# Patient Record
Sex: MALE | URBAN - METROPOLITAN AREA
[De-identification: names, ages, dates, MRNs, and addresses within clinical notes are randomized per-mention and may not be internally consistent; named-entity substitution may affect disease eponyms.]

---

## 2019-09-18 ENCOUNTER — DOCUMENTATION ONLY (OUTPATIENT)
Dept: REHABILITATION | Facility: HOSPITAL | Age: 69
End: 2019-09-18

## 2019-09-18 NOTE — PROGRESS NOTES
REHAB SERVICES OUTPATIENT DISCHARGE SUMMARY  Speech Therapy      Name:  Shawn Orozco  Date:  09/18/2019    Date of Evaluation:  05/13/2019  Physician:  Frankie Pringle MD  Total # Of Visits:  11  Cancelled:  1  No Shows:  0  Diagnosis:  No diagnosis found.    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  Patient tolerated NMES to L facial musculature with no complaints.  Last measurement of degree of facial flaccidity was 6 degrees with a goal of 7degrees.    The patient is to be discharged from our Therapy service for the following reason(s):   Discharge planning was to be initiated with Patient and he was transitioned into [employer] for continuance of schedule.  Patient canceled his last session and was to be scheduled after the facility's crossover to Epic EMR system however he did not attend.    Degree of Goal Achievement:  Patient has met all goals for performing oral/facial exercise with MIN assist and to decreased degree of facial flaccidity to 7 degrees.    Patient Education:  Ongoing during tx re HEP    Discharge Plan:  Home Program:  Continue as rx.

## 2025-02-26 ENCOUNTER — OFFICE VISIT (OUTPATIENT)
Dept: PRIMARY CARE CLINIC | Facility: CLINIC | Age: 75
End: 2025-02-26
Payer: MEDICARE

## 2025-02-26 VITALS
HEIGHT: 69 IN | SYSTOLIC BLOOD PRESSURE: 120 MMHG | DIASTOLIC BLOOD PRESSURE: 70 MMHG | HEART RATE: 81 BPM | WEIGHT: 245.69 LBS | RESPIRATION RATE: 16 BRPM | BODY MASS INDEX: 36.39 KG/M2 | OXYGEN SATURATION: 95 % | TEMPERATURE: 98 F

## 2025-02-26 DIAGNOSIS — R73.03 PREDIABETES: ICD-10-CM

## 2025-02-26 DIAGNOSIS — E78.5 HYPERLIPIDEMIA, UNSPECIFIED HYPERLIPIDEMIA TYPE: ICD-10-CM

## 2025-02-26 DIAGNOSIS — J32.0 CHRONIC MAXILLARY SINUSITIS: ICD-10-CM

## 2025-02-26 DIAGNOSIS — Z00.00 ANNUAL PHYSICAL EXAM: Primary | ICD-10-CM

## 2025-02-26 DIAGNOSIS — J44.9 CHRONIC OBSTRUCTIVE PULMONARY DISEASE, UNSPECIFIED COPD TYPE: ICD-10-CM

## 2025-02-26 DIAGNOSIS — E66.01 SEVERE OBESITY (BMI 35.0-39.9) WITH COMORBIDITY: ICD-10-CM

## 2025-02-26 DIAGNOSIS — M1A.0720 CHRONIC GOUT OF LEFT FOOT, UNSPECIFIED CAUSE: ICD-10-CM

## 2025-02-26 DIAGNOSIS — Z76.89 ENCOUNTER TO ESTABLISH CARE: ICD-10-CM

## 2025-02-26 DIAGNOSIS — I10 PRIMARY HYPERTENSION: ICD-10-CM

## 2025-02-26 PROBLEM — M54.12 CERVICAL RADICULOPATHY: Status: ACTIVE | Noted: 2025-02-26

## 2025-02-26 PROBLEM — M16.9 OSTEOARTHRITIS OF HIP: Status: ACTIVE | Noted: 2025-02-26

## 2025-02-26 PROBLEM — E55.9 VITAMIN D DEFICIENCY: Status: ACTIVE | Noted: 2025-02-26

## 2025-02-26 PROBLEM — D48.5 NEOPLASM OF UNCERTAIN BEHAVIOR OF SKIN: Status: ACTIVE | Noted: 2025-02-26

## 2025-02-26 PROBLEM — E66.812 OBESITY, CLASS II, BMI 35-39.9: Status: ACTIVE | Noted: 2025-02-26

## 2025-02-26 PROBLEM — M17.32 UNILATERAL POST-TRAUMATIC OSTEOARTHRITIS, LEFT KNEE: Status: ACTIVE | Noted: 2025-02-26

## 2025-02-26 PROBLEM — E66.9 OBESITY: Status: ACTIVE | Noted: 2025-02-26

## 2025-02-26 PROCEDURE — 3078F DIAST BP <80 MM HG: CPT | Mod: CPTII,S$GLB,, | Performed by: STUDENT IN AN ORGANIZED HEALTH CARE EDUCATION/TRAINING PROGRAM

## 2025-02-26 PROCEDURE — 1160F RVW MEDS BY RX/DR IN RCRD: CPT | Mod: CPTII,S$GLB,, | Performed by: STUDENT IN AN ORGANIZED HEALTH CARE EDUCATION/TRAINING PROGRAM

## 2025-02-26 PROCEDURE — 99204 OFFICE O/P NEW MOD 45 MIN: CPT | Mod: S$GLB,,, | Performed by: STUDENT IN AN ORGANIZED HEALTH CARE EDUCATION/TRAINING PROGRAM

## 2025-02-26 PROCEDURE — 1126F AMNT PAIN NOTED NONE PRSNT: CPT | Mod: CPTII,S$GLB,, | Performed by: STUDENT IN AN ORGANIZED HEALTH CARE EDUCATION/TRAINING PROGRAM

## 2025-02-26 PROCEDURE — 3074F SYST BP LT 130 MM HG: CPT | Mod: CPTII,S$GLB,, | Performed by: STUDENT IN AN ORGANIZED HEALTH CARE EDUCATION/TRAINING PROGRAM

## 2025-02-26 PROCEDURE — 1159F MED LIST DOCD IN RCRD: CPT | Mod: CPTII,S$GLB,, | Performed by: STUDENT IN AN ORGANIZED HEALTH CARE EDUCATION/TRAINING PROGRAM

## 2025-02-26 PROCEDURE — 99999 PR PBB SHADOW E&M-NEW PATIENT-LVL V: CPT | Mod: PBBFAC,,, | Performed by: STUDENT IN AN ORGANIZED HEALTH CARE EDUCATION/TRAINING PROGRAM

## 2025-02-26 PROCEDURE — 1101F PT FALLS ASSESS-DOCD LE1/YR: CPT | Mod: CPTII,S$GLB,, | Performed by: STUDENT IN AN ORGANIZED HEALTH CARE EDUCATION/TRAINING PROGRAM

## 2025-02-26 PROCEDURE — 3288F FALL RISK ASSESSMENT DOCD: CPT | Mod: CPTII,S$GLB,, | Performed by: STUDENT IN AN ORGANIZED HEALTH CARE EDUCATION/TRAINING PROGRAM

## 2025-02-26 RX ORDER — MONTELUKAST SODIUM 10 MG/1
10 TABLET ORAL DAILY
COMMUNITY
Start: 2020-01-08

## 2025-02-26 RX ORDER — CYANOCOBALAMIN (VITAMIN B-12) 500 MCG
10 TABLET ORAL DAILY
COMMUNITY
Start: 2024-04-08

## 2025-02-26 RX ORDER — IBUPROFEN 800 MG/1
800 TABLET ORAL 3 TIMES DAILY
COMMUNITY
Start: 1979-08-31

## 2025-02-26 RX ORDER — CETIRIZINE HYDROCHLORIDE 10 MG/1
10 TABLET ORAL NIGHTLY
COMMUNITY
Start: 2024-07-15

## 2025-02-26 RX ORDER — AMLODIPINE BESYLATE 10 MG/1
0.5 TABLET ORAL DAILY
COMMUNITY
Start: 2015-01-08

## 2025-02-26 RX ORDER — FLUTICASONE PROPIONATE AND SALMETEROL 250; 50 UG/1; UG/1
1 POWDER RESPIRATORY (INHALATION) DAILY PRN
COMMUNITY
Start: 2024-07-31

## 2025-02-26 RX ORDER — ATORVASTATIN CALCIUM 40 MG/1
20 TABLET, FILM COATED ORAL NIGHTLY
COMMUNITY
Start: 2020-01-08

## 2025-02-26 RX ORDER — COLCHICINE 0.6 MG/1
0.6 TABLET ORAL DAILY PRN
COMMUNITY
Start: 2024-10-25

## 2025-02-26 RX ORDER — TELMISARTAN 80 MG/1
1 TABLET ORAL DAILY
COMMUNITY
Start: 2020-01-08

## 2025-02-26 RX ORDER — ALBUTEROL SULFATE 90 UG/1
2 INHALANT RESPIRATORY (INHALATION) EVERY 6 HOURS PRN
COMMUNITY
Start: 2024-10-07

## 2025-02-26 RX ORDER — ALLOPURINOL 100 MG/1
200 TABLET ORAL DAILY
COMMUNITY
Start: 2023-01-08

## 2025-02-26 RX ORDER — HYDROCHLOROTHIAZIDE 12.5 MG/1
37.5 TABLET ORAL DAILY
COMMUNITY
Start: 2024-04-08

## 2025-02-26 NOTE — PROGRESS NOTES
Assessment:       1. Annual physical exam    2. Encounter to establish care    3. Chronic gout of left foot, unspecified cause    4. Primary hypertension    5. Hyperlipidemia, unspecified hyperlipidemia type    6. Chronic obstructive pulmonary disease, unspecified COPD type    7. Prediabetes    8. Chronic maxillary sinusitis    9. Severe obesity (BMI 35.0-39.9) with comorbidity           Plan:     Assessment & Plan    J44.9 Chronic obstructive pulmonary disease, unspecified COPD type  Z00.00 Annual physical exam  Z76.89 Encounter to establish care  [id number].0720 Chronic gout of left foot, unspecified cause  I10 Primary hypertension  E78.5 Hyperlipidemia, unspecified hyperlipidemia type  R73.03 Prediabetes  J32.0 Chronic maxillary sinusitis  E66.812 Obesity, Class II, BMI 35-39.9  E66.01 Severe obesity (BMI 35.0-39.9) with comorbidity    IMPRESSION:  - Reviewed patient's medical history, current medications, and [facility]  - Noted stable hypertension and hyperlipidemia managed with current medication regimen  - Assessed pre-diabetes based on HbA1c of 6.0%, but no immediate intervention required  - Evaluated complaint of numbness in thumb, likely localized issue rather than nerve impingement  - Observed mild lower extremity edema, potentially related to amlodipine use  - Kidney and liver function normal based on recent labs    PLAN SUMMARY:  - Added prediabetes to problem list  - [facility]  - Recommend diet changes: reasonable portion sizes and whole foods for weight management  - Monitor glucose levels regularly    Z76.89 ENCOUNTER TO ESTABLISH CARE:  - Advised the patient to follow up with [facility] and [employer] [facility] as scheduled.    R73.03 PREDIABETES:  - Added prediabetes to the problem list based on previous glucose levels.  - Current glucose level is 93 mg/dL.  - Discussed the importance of portion control and emphasizing whole foods in diet for managing  prediabetes.  - Instructed the patient to monitor glucose levels regularly, as previous level of 6% indicated prediabetes.    E66.01 SEVERE OBESITY (BMI 35.0-39.9) WITH COMORBIDITY:  - Observed that the patient has gained weight after hip and knee issues, but weight has been stable for a while.  - Noted fluid retention in the patient's shins, indicating some pitting edema.  - Recommend reasonable portion sizes and emphasized whole foods in diet for weight management.             Annual physical exam    Encounter to establish care    Chronic gout of left foot, unspecified cause    Primary hypertension    Hyperlipidemia, unspecified hyperlipidemia type    Chronic obstructive pulmonary disease, unspecified COPD type    Prediabetes  Comments:  A1c was 6.0 in 2024.    Chronic maxillary sinusitis    Severe obesity (BMI 35.0-39.9) with comorbidity                This note was generated with the assistance of ambient listening technology. Verbal consent was obtained by the patient and accompanying visitor(s) for the recording of patient appointment to facilitate this note. I attest to having reviewed and edited the generated note for accuracy, though some syntax or spelling errors may persist. Please contact the author of this note for any clarification.      Subjective:           Patient ID: Shawn Orozco   Age:  74 y.o.  Sex: male     Chief Complaint:   Establish Care and Annual Exam      History of Present Illness:    Shawn Orozco is a 74 y.o. male who presents today with a chief complaint of Establish Care and Annual Exam  .    [facility] [employer] as well.  Has 2 chronic conditions, HTN and HLD.  Has been on meds for 20 years.    Was told he needed to get this verified for insurance.    Chronic NSAID use to the left knee.  Using 800mg about 2 times daily since 1979.        History of Present Illness    CHIEF COMPLAINT:  Shawn presents today for follow up    MUSCULOSKELETAL:  He  "experiences neck issues with a crunching sensation in the upper cervical region. He recently underwent an outpatient spinal injection procedure that provided moderate relief. He has left knee problems with scheduled surgery in 4 months. He takes ibuprofen since  for knee pain, typically two tablets 99% of the time, reporting significant discomfort without it. His left hip needs replacement, and his right hip has already been replaced. He denies any sharp pains in the affected areas.    RECENT SYMPTOMS:  He reports numbness in a specific area of his thumb, described as feeling "asleep" without associated pain. He notes mild fluid retention in his shins.    CHRONIC MEDICAL CONDITIONS:  He has a 20-year history of hypertension and hypercholesterolemia, both managed with medications. He has a history of gout affecting the left foot with no attacks since starting medication.    RESPIRATORY AND ALLERGIES:  He uses albuterol inhaler infrequently, with one inhaler lasting approximately one year, primarily for cold symptoms. He takes Cetirizine twice daily, which has significantly improved his breathing and nasal congestion, particularly noting clear nasal passages upon waking.    DERMATOLOGIC:  He has a history of multiple skin cancers, including basal cell carcinoma.    SURGICAL HISTORY:  His surgical history includes bilateral cataract surgery, right hip replacement, three left knee surgeries, vasectomy in , and recent cervical spine injection procedure.    FAMILY HISTORY:  Father  from prostate cancer. Mother  with history of minor strokes. Both parents had hypertension.    SOCIAL HISTORY:  He denies smoking history and is .      ROS:  ENT: +nasal congestion  Musculoskeletal: +joint pain, -joint swelling  Neurological: +numbness           Review of Systems   Constitutional: Negative.  Negative for fatigue and fever.   HENT: Negative.  Negative for congestion, sinus pressure, sneezing and " "sore throat.    Eyes: Negative.    Respiratory: Negative.  Negative for cough, shortness of breath and wheezing.    Cardiovascular: Negative.  Negative for chest pain, palpitations and leg swelling.   Gastrointestinal: Negative.  Negative for constipation, diarrhea, nausea and vomiting.   Endocrine: Negative.    Genitourinary: Negative.  Negative for difficulty urinating, frequency and urgency.   Musculoskeletal: Negative.    Skin: Negative.    Allergic/Immunologic: Negative for food allergies.   Neurological:  Positive for numbness (distal portion of left thumb). Negative for weakness and headaches.   Psychiatric/Behavioral: Negative.  Negative for sleep disturbance. The patient is not nervous/anxious.            Objective:        Vitals:    02/26/25 1135   BP: 120/70   BP Location: Right arm   Patient Position: Sitting   Pulse: 81   Resp: 16   Temp: 97.7 °F (36.5 °C)   TempSrc: Oral   SpO2: 95%   Weight: 111.4 kg (245 lb 11.2 oz)   Height: 5' 9" (1.753 m)       Body mass index is 36.28 kg/m².      Physical Exam  Vitals reviewed.   Constitutional:       General: He is not in acute distress.     Appearance: He is obese. He is not ill-appearing or toxic-appearing.      Comments: As per BMI.   HENT:      Head: Normocephalic and atraumatic.      Right Ear: External ear normal.      Left Ear: External ear normal.      Nose: Nose normal. No congestion.      Mouth/Throat:      Mouth: Mucous membranes are moist.      Pharynx: Oropharynx is clear.   Eyes:      Extraocular Movements: Extraocular movements intact.      Conjunctiva/sclera: Conjunctivae normal.   Cardiovascular:      Rate and Rhythm: Normal rate and regular rhythm.      Heart sounds: No murmur heard.  Pulmonary:      Effort: Pulmonary effort is normal. No respiratory distress.      Breath sounds: No wheezing.   Abdominal:      General: Bowel sounds are normal.      Palpations: Abdomen is soft.   Musculoskeletal:         General: No swelling, tenderness or " "deformity.      Cervical back: Normal range of motion.   Skin:     General: Skin is warm.      Capillary Refill: Capillary refill takes less than 2 seconds.      Coloration: Skin is not jaundiced.   Neurological:      General: No focal deficit present.      Mental Status: He is alert and oriented to person, place, and time.      Motor: No weakness.      Gait: Gait normal.   Psychiatric:         Mood and Affect: Mood normal.         Physical Exam    Extremities: Pitting edema in shins. Normal circulation to fingertips.               Past Medical History[1]    No results found for: "NA", "K", "CL", "CO2", "BUN", "CREATININE", "GLUCOSE", "ANIONGAP"  No results found for: "HGBA1C"  No results found for: "BNP", "BNPTRIAGEBLO"    No results found for: "WBC", "HGB", "HCT", "PLT", "GRAN"  No results found for: "CHOL", "HDL", "LDLCALC", "TRIG"     Encounter Medications[2]              [1]   Past Medical History:  Diagnosis Date    Hyperlipidemia     Hypertension    [2]   Outpatient Encounter Medications as of 2/26/2025   Medication Sig Dispense Refill    albuterol (PROVENTIL/VENTOLIN HFA) 90 mcg/actuation inhaler Inhale 2 puffs into the lungs every 6 (six) hours as needed for Wheezing or Shortness of Breath.      allopurinoL (ZYLOPRIM) 100 MG tablet Take 200 mg by mouth once daily.      amLODIPine (NORVASC) 10 MG tablet Take 0.5 tablets by mouth once daily.      atorvastatin (LIPITOR) 40 MG tablet Take 20 mg by mouth every evening.      cetirizine (ZYRTEC) 10 MG tablet Take 10 mg by mouth every evening.      cholecalciferol, vitamin D3, (VITAMIN D3) 10 mcg (400 unit) Tab Take 10 mcg by mouth once daily.      colchicine (COLCRYS) 0.6 mg tablet Take 0.6 mg by mouth daily as needed.      fluticasone-salmeterol diskus inhaler 250-50 mcg Take 1 puff by mouth daily as needed.      hydroCHLOROthiazide 12.5 MG Tab Take 37.5 mg by mouth once daily.      ibuprofen (IBU) 800 MG tablet Take 800 mg by mouth 3 (three) times daily.      " montelukast (SINGULAIR) 10 mg tablet Take 10 mg by mouth once daily.      telmisartan (MICARDIS) 80 MG Tab Take 1 tablet by mouth once daily.       No facility-administered encounter medications on file as of 2/26/2025.

## 2025-02-26 NOTE — PATIENT INSTRUCTIONS
IMPRESSION:  - Reviewed patient's medical history, current medications, and [facility]  - Noted stable hypertension and hyperlipidemia managed with current medication regimen  - Assessed pre-diabetes based on HbA1c of 6.0%, but no immediate intervention required  - Evaluated complaint of numbness in thumb, likely localized issue rather than nerve impingement  - Observed mild lower extremity edema, potentially related to amlodipine use  - Kidney and liver function normal based on recent labs    PLAN SUMMARY:  - Added prediabetes to problem list  - [facility]  - Recommend diet changes: reasonable portion sizes and whole foods for weight management  - Monitor glucose levels regularly    Z76.89 ENCOUNTER TO ESTABLISH CARE:  - Advised the patient to [facility] as scheduled.    R73.03 PREDIABETES:  - Added prediabetes to the problem list based on previous glucose levels.  - Current glucose level is 93 mg/dL.  - Discussed the importance of portion control and emphasizing whole foods in diet for managing prediabetes.  - Instructed the patient to monitor glucose levels regularly, as previous level of 6% indicated prediabetes.    E66.01 SEVERE OBESITY (BMI 35.0-39.9) WITH COMORBIDITY:  - Observed that the patient has gained weight after hip and knee issues, but weight has been stable for a while.  - Noted fluid retention in the patient's shins, indicating some pitting edema.  - Recommend reasonable portion sizes and emphasized whole foods in diet for weight management.         Weight Loss:   - Body mass index is 36.28 kg/m².   - Normal weight is BMI 18-24.9.     - Overweight: 25-29.9  - Class 1 Obesity: 30-34.9  - Class 2 Obesity: 35-39.9  - Class 3 Obesity: 40+  - A BMI under 18 also shows diminished health outcomes.   - best health outcomes have been seen at a BMI of 22.    - excess weigh affects many body systems, including: cardiac,  "respiratory, GI, endocrine, musculoskeletal, dermatologic, reproductive, mental health and more.   - recommended moderate weight change, 1-2lbs per weeks.   - focus on eating a healthy sustainable diet.  Use food diary for at least a couple weeks to better understand what your diet.   - consider caren such as "Lose It" or "Noom".   - avoid empty calories that you may use daily from items such as like soda, sweet tea, sugary coffee, ice cream, cake/pie, cookies/brownies/crackers or candy.  An occasional piece of birthday cake is not the cause of obesity, but a daily Frappaccino could be to blame.    - "Low Fat" on a box or bag should be eyed with caution, this fat it typically replaced with forms of sugar/carbs and the resultant product may be less healthy than other products.   - when possible eating whole foods is almost always preferable.  A diet of salads, green beans, broccoli, cauliflower, cucumbers, sweet potatoes, peppers, olives/avocados, tomatoes, beats, berries, eggs, meat/fish, shrimp/crawfish with some limited fruit (apples/oranges/bananas/grapes) and even more limited grains/starches (pasta/rice/bread/potatoes/cereal) will serve you much better in the long term than eating a bunch of diet bars, shakes or powders.     - Exercise has many benefits (heart health, improved mood/energy, higher self esteem, less depression, greater strength/flexibility, better sleep, less stress/anxiety, improved immune system, stronger bones, improved cognition, fewer colds/asthma exacerbations), it also does help lose weight.  But weight loss from exercise is much less impactful than when a change in diet can achieve.  Exercise is highly encouraged, but diet change should be the primary tool used to lose weight.         Annual physical exam    Encounter to establish care    Chronic gout of left foot, unspecified cause    Primary hypertension    Hyperlipidemia, unspecified hyperlipidemia type    Chronic obstructive pulmonary " disease, unspecified COPD type    Prediabetes  Comments:  A1c was 6.0 in 2024.    Chronic maxillary sinusitis    Obesity, Class II, BMI 35-39.9    Severe obesity (BMI 35.0-39.9) with comorbidity